# Patient Record
Sex: FEMALE | Race: WHITE | NOT HISPANIC OR LATINO | Employment: FULL TIME | URBAN - METROPOLITAN AREA
[De-identification: names, ages, dates, MRNs, and addresses within clinical notes are randomized per-mention and may not be internally consistent; named-entity substitution may affect disease eponyms.]

---

## 2024-12-03 ENCOUNTER — HOSPITAL ENCOUNTER (OUTPATIENT)
Dept: RADIOLOGY | Facility: EXTERNAL LOCATION | Age: 39
Discharge: HOME | End: 2024-12-03

## 2024-12-03 DIAGNOSIS — R92.8 ABNORMAL MAMMOGRAM: ICD-10-CM

## 2024-12-24 ENCOUNTER — HOSPITAL ENCOUNTER (OUTPATIENT)
Dept: RADIOLOGY | Facility: HOSPITAL | Age: 39
Discharge: HOME | End: 2024-12-24
Payer: COMMERCIAL

## 2024-12-24 VITALS — HEIGHT: 64 IN | BODY MASS INDEX: 29.88 KG/M2 | WEIGHT: 175 LBS

## 2024-12-24 DIAGNOSIS — R92.8 ABNORMAL MAMMOGRAM: ICD-10-CM

## 2024-12-24 PROCEDURE — 77066 DX MAMMO INCL CAD BI: CPT | Mod: LEFT SIDE | Performed by: RADIOLOGY

## 2024-12-24 PROCEDURE — 77062 BREAST TOMOSYNTHESIS BI: CPT

## 2024-12-24 PROCEDURE — 77062 BREAST TOMOSYNTHESIS BI: CPT | Mod: LEFT SIDE | Performed by: RADIOLOGY

## 2024-12-24 PROCEDURE — 76642 ULTRASOUND BREAST LIMITED: CPT | Mod: LEFT SIDE | Performed by: RADIOLOGY

## 2024-12-24 PROCEDURE — 76981 USE PARENCHYMA: CPT | Mod: LT

## 2024-12-24 PROCEDURE — 76642 ULTRASOUND BREAST LIMITED: CPT | Mod: LT

## 2024-12-27 ENCOUNTER — OFFICE VISIT (OUTPATIENT)
Dept: SURGICAL ONCOLOGY | Facility: HOSPITAL | Age: 39
End: 2024-12-27
Payer: COMMERCIAL

## 2024-12-27 VITALS
HEIGHT: 64 IN | WEIGHT: 175 LBS | RESPIRATION RATE: 16 BRPM | BODY MASS INDEX: 29.88 KG/M2 | DIASTOLIC BLOOD PRESSURE: 66 MMHG | HEART RATE: 81 BPM | SYSTOLIC BLOOD PRESSURE: 102 MMHG

## 2024-12-27 DIAGNOSIS — N61.22 GRANULOMATOUS MASTITIS OF LEFT BREAST: Primary | ICD-10-CM

## 2024-12-27 DIAGNOSIS — N64.4 BREAST PAIN: ICD-10-CM

## 2024-12-27 PROCEDURE — 99203 OFFICE O/P NEW LOW 30 MIN: CPT | Performed by: NURSE PRACTITIONER

## 2024-12-27 PROCEDURE — 99213 OFFICE O/P EST LOW 20 MIN: CPT | Performed by: NURSE PRACTITIONER

## 2024-12-27 PROCEDURE — 3008F BODY MASS INDEX DOCD: CPT | Performed by: NURSE PRACTITIONER

## 2024-12-27 RX ORDER — PREDNISONE 10 MG/1
TABLET ORAL
Qty: 138 TABLET | Refills: 0 | Status: SHIPPED | OUTPATIENT
Start: 2024-12-27

## 2024-12-27 RX ORDER — ACETAMINOPHEN 500 MG
TABLET ORAL EVERY 6 HOURS PRN
COMMUNITY

## 2024-12-27 RX ORDER — GUAIFENESIN 600 MG/1
1200 TABLET, EXTENDED RELEASE ORAL 2 TIMES DAILY
COMMUNITY

## 2024-12-27 RX ORDER — IBUPROFEN 200 MG
200 TABLET ORAL EVERY 6 HOURS
COMMUNITY

## 2024-12-27 NOTE — PROGRESS NOTES
[facility] [place]  [place]   1985 39 y.o.   [id number]      Chief Complaint  New patient, left breast granulomatous mastitis.    History Of Present Illness  Shira Negro is a very pleasant 39 y.o.  female presenting to the breast center with a left breast mass. Her breasts are normally tender around her cycle and goes away. The week of 10/15/24 the left breast pain did not go away and noticed that her nipple was retracted and felt a left breast mass. She had imaging at [facility] and a biopsy was recommended. 24 she had a left breast ultrasound guided core biopsy. The pain after was worse and more swollen and full. Pathology showed granulomatous changes with inflammation. She completed 10 days of Bactrim and Doxycycline and felt relief. She saw infection disease for further evaluation. She denies breast surgery. She has a family history of breast cancer in her maternal grandfather, age 40. Today she does have some tenderness but denies redness, warmth, fever, chills or drainage.    BREAST IMAGIN2024 Bilateral diagnostic mammogram with left ultrasound, indicates BI-RADS Category 2. No mammographic or targeted sonographic evidence of malignancy. Palpable area of concern corresponds to the areas previously biopsied. Clinical follow-up is recommended.    REPRODUCTIVE HISTORY: menarche age 14, , first birth age 32, did not breastfeed, OCP's x 6 years, premenopausal, LMP 24, heterogeneously dense                                  FAMILY CANCER HISTORY:   Maternal Grandfather: Breast cancer, age 40     Review of Systems  Constitutional:  Negative for appetite change, fatigue, fever and unexpected weight change.   HENT:  Negative for ear pain, hearing loss, nosebleeds, sore throat and trouble swallowing. Positive hoarseness.  Eyes:  Negative for discharge, itching and visual disturbance.   Breast: As stated in HPI.  Respiratory:  Negative for chest tightness and  shortness of breath.  Positive cough.  Cardiovascular:  Negative for chest pain, palpitations and leg swelling.   Gastrointestinal:  Negative for abdominal pain, constipation, diarrhea and nausea.   Endocrine: Negative for cold intolerance and heat intolerance.   Genitourinary:  Negative for dysuria, frequency, hematuria, pelvic pain and vaginal bleeding.   Musculoskeletal:  Negative for arthralgias, back pain, gait problem, joint swelling and myalgias.   Skin:  Negative for color change and rash.   Allergic/Immunologic: Negative for environmental allergies and food allergies.   Neurological:  Negative for dizziness, tremors, speech difficulty, weakness, numbness. Positive headache.   Hematological:  Does not bruise/bleed easily.   Psychiatric/Behavioral:  Negative for agitation, dysphoric mood and sleep disturbance. The patient is not nervous/anxious.       Past Medical History  She has no past medical history on file.    Surgical History  She has no past surgical history on file.    Family History  Cancer-related family history includes Breast cancer (age of onset: 40) in her maternal grandfather.     Social History  She has no history on file for tobacco use, alcohol use, and drug use.    Allergies  Patient has no known allergies.    Medications  Current Outpatient Medications   Medication Instructions    acetaminophen (Tylenol) 500 mg tablet oral, Every 6 hours PRN    guaiFENesin (MUCINEX) 1,200 mg, oral, 2 times daily, Do not crush, chew, or split.    ibuprofen 200 mg, oral, Every 6 hours    predniSONE (Deltasone) 10 mg tablet Weeks 1-2: 30 mg (3 tabs) PO BID x14 days. Week 3: 20mg (2 tabs) PO BID x7 days. Week 4: 10mg (1 tab) PO BID x7 days. Week 5: 5 mg (1/2 tab) PO BID x7 days. Week 6: 5 mg (1/2 tab) PO daily x7 days, D/C       Last Recorded Vitals  Vitals:    12/27/24 1253   BP: 102/66   Pulse: 81   Resp: 16        Physical Exam  Chest:         Patient is alert and oriented x3 and in a relaxed and  appropriate mood. Her gait is steady and hand grasps are equal. Sclera is clear. The breasts are nearly symmetrical. Left breast mass subareolar and the nipple is retracted. The skin is without erythema or warmth. The right breast tissue is soft without palpable abnormalities, discrete nodules or masses. The skin and nipple appear normal. There is no cervical, supraclavicular or axillary lymphadenopathy.     Relevant Results and Imaging  BI mammo bilateral diagnostic tomosynthesis 12/24/2024  BI US breast limited left 12/24/2024    Narrative  Interpreted By:  Lotus Early,  STUDY:  BI US BREAST LIMITED LEFT; BI MAMMO BILATERAL DIAGNOSTIC  TOMOSYNTHESIS;  12/24/2024 3:15 pm; 12/24/2024 3:35 pm    ACCESSION NUMBER(S):  [id number]; [id number]    ORDERING CLINICIAN:  MCKAY DENNISON    INDICATION:  History of 2 benign left breast biopsies. Patient had an outside  consultation and additional images were recommended for bilateral  asymmetries. Ultrasound was also recommended for a left breast  palpable area of concern.    ,R92.8 Other abnormal and inconclusive findings on diagnostic imaging  of breast    COMPARISON:  11/26/2024, 11/15/2024    FINDINGS:  MAMMOGRAPHY: 2D and tomosynthesis images were reviewed at 1 mm slice  thickness.    Density:  The breasts are heterogeneously dense, which may obscure  small masses.    The previously noted asymmetry centrally at far posterior depth on  the right MLO view resolves into fibroglandular and fatty breast  tissue with the additional images. The previously noted masslike  asymmetry in the lateral left breast resolves into fibroglandular and  fatty breast tissue. An ultrasound on the left will be performed for  further evaluation. No suspicious masses or calcifications are  identified.    ULTRASOUND: Targeted ultrasound was performed of the lateral left  breast. There are no suspicious masses or suspicious areas of  acoustic shadowing. The previous area of palpable concern  was  evaluated. The palpable area of corresponds to the biopsy sites with  2 biopsy marking clips.    Impression  No mammographic or targeted sonographic evidence of malignancy.  Palpable area of concern corresponds to the areas previously  biopsied. Clinical follow-up is recommended.    BI-RADS CATEGORY:  BI-RADS Category:  2 Benign.  Recommendation:  Clinical Follow-up and Continued Annual Screening.  Recommended Date:  Immediate.  Laterality:  Left.    For any future breast imaging appointments, please call [phone]).      MACRO:  None    Signed by: Lotus Early 12/24/2024 3:38 PM  Dictation workstation:   [id number]      I explained the results in depth, along with suggested explanation for follow up recommendations based on the testing results. BI-RADS Category 2    Orders  Orders Placed This Encounter      predniSONE (Deltasone) 10 mg tablet       Visit Diagnosis  1. Granulomatous mastitis of left breast  predniSONE (Deltasone) 10 mg tablet      2. Breast pain            Assessment/Plan  Left breast granulomatous mastitis on core biopsy, left breast pain, family history breast cancer, heterogeneously dense    Plan/Patient Discussion/Summary  Prednisone taper over 12 weeks as directed. Follow up in 3 weeks or sooner if symptoms worsen.    You can see your health information, review clinical summaries from office visits & test results online when you follow your health with MY  Chart, a personal health record. To sign up go to [email or web address]. If you need assistance with signing up or trouble getting into your account call [facility] Patient Line 24/7 at [phone].    My office phone number is [phone] if you need to get in touch with me or have additional questions or concerns. Thank you for choosing [facility] and trusting me as your healthcare provider. I look forward to seeing you again at your next office visit. I am honored to be a provider on your health care  team and I remain dedicated to helping you achieve your health goals.    Zoie Rizo, APRN-CNP

## 2025-01-14 NOTE — PROGRESS NOTES
[facility] - [place]  Shira Negro female   1985 39 y.o.   [id number]      Chief Complaint  Follow up left breast granulomatous mastitis.    History Of Present Illness  Shira Negro is a very pleasant 39 y.o.  female presenting to the breast center with a left breast mass. Her breasts are normally tender around her cycle and goes away. The week of 10/15/24 the left breast pain did not go away and noticed that her nipple was retracted and felt a left breast mass. She had imaging at [facility] and a biopsy was recommended. 24 she had a left breast ultrasound guided core biopsy. The pain after was worse and more swollen and full. Pathology showed granulomatous changes with inflammation. She completed 10 days of Bactrim and Doxycycline and felt relief. She saw infection disease for further evaluation. She denies breast surgery. She has a family history of breast cancer in her maternal grandfather, age 40. Today she does have some tenderness but denies redness, warmth, fever, chills or drainage. She feels feels her breast felt better and her nipple was more normal at the highest dose of steroids. As she has started to taper down she has noticed that her symptoms are coming back. With the prednisone she has noticed an increase in appetite, irritability, and bloating.    BREAST IMAGIN2024 Bilateral diagnostic mammogram with left ultrasound, indicates BI-RADS Category 2. No mammographic or targeted sonographic evidence of malignancy. Palpable area of concern corresponds to the areas previously biopsied. Clinical follow-up is recommended.    REPRODUCTIVE HISTORY: menarche age 14, , first birth age 32, did not breastfeed, OCP's x 6 years, premenopausal, LMP , heterogeneously dense                                  FAMILY CANCER HISTORY:   Maternal Grandfather: Breast cancer, age 40     Review of Systems  Constitutional:  Negative for appetite change, fatigue, fever. Positive weight  gain.  HENT:  Negative for ear pain, hearing loss, nosebleeds, sore throat and trouble swallowing.    Eyes:  Negative for discharge, itching and visual disturbance.   Breast: As stated in HPI.  Respiratory:  Negative for cough, chest tightness and shortness of breath.    Cardiovascular:  Negative for chest pain, palpitations and leg swelling.   Gastrointestinal:  Negative for abdominal pain, constipation, diarrhea and nausea.   Endocrine: Negative for cold intolerance and heat intolerance.   Genitourinary:  Negative for dysuria, frequency, hematuria, pelvic pain and vaginal bleeding.   Musculoskeletal:  Negative for arthralgias, back pain, gait problem, joint swelling and myalgias.   Skin:  Negative for color change and rash.   Allergic/Immunologic: Negative for environmental allergies and food allergies.   Neurological:  Negative for dizziness, tremors, speech difficulty, weakness, numbness and headaches.   Hematological:  Does not bruise/bleed easily.   Psychiatric/Behavioral:  Negative for agitation, dysphoric mood and sleep disturbance. The patient is not nervous/anxious.         Past Medical History  She has no past medical history on file.    Surgical History  She has no past surgical history on file.    Family History  Cancer-related family history includes Breast cancer (age of onset: 40) in her maternal grandfather.     Social History  She reports that she has never smoked. She has never used smokeless tobacco. No history on file for alcohol use and drug use.    Allergies  Patient has no known allergies.    Medications  Current Outpatient Medications   Medication Instructions    acetaminophen (Tylenol) 500 mg tablet Every 6 hours PRN    famotidine (Pepcid) 10 mg tablet oral    guaiFENesin (MUCINEX) 1,200 mg, 2 times daily    ibuprofen 200 mg, Every 6 hours    predniSONE (Deltasone) 10 mg tablet Weeks 1-2: 30 mg (3 tabs) PO BID x14 days. Week 3: 20mg (2 tabs) PO BID x7 days. Week 4: 10mg (1 tab) PO BID x7  days. Week 5: 5 mg (1/2 tab) PO BID x7 days. Week 6: 5 mg (1/2 tab) PO daily x7 days, D/C    predniSONE (Deltasone) 10 mg tablet Weeks 1-2: 30 mg (3 tabs) PO BID x14 days. Week 3: 20mg (2 tabs) PO BID x7 days. Week 4: 10mg (1 tab) PO BID x7 days. Week 5: 5 mg (1/2 tab) PO BID x7 days. Week 6: 5 mg (1/2 tab) PO daily x7 days, D/C       Last Recorded Vitals  Vitals:    01/16/25 1245   BP: 122/70   Pulse: 96   Resp: 16          Physical Exam  Chest:         Patient is alert and oriented x3 and in a relaxed and appropriate mood. Her gait is steady and hand grasps are equal. Sclera is clear. The breasts are nearly symmetrical. Left breast mass subareolar and the nipple is retracted and mass measures 1.5 x 1.5 cm. The skin is without erythema or warmth. The right breast tissue is soft without palpable abnormalities, discrete nodules or masses. The skin and nipple appear normal. There is no cervical, supraclavicular or axillary lymphadenopathy.     Relevant Results and Imaging  BI mammo bilateral diagnostic tomosynthesis 12/24/2024  BI US breast limited left 12/24/2024    Narrative  Interpreted By:  Lotus Early,  STUDY:  BI US BREAST LIMITED LEFT; BI MAMMO BILATERAL DIAGNOSTIC  TOMOSYNTHESIS;  12/24/2024 3:15 pm; 12/24/2024 3:35 pm    ACCESSION NUMBER(S):  [id number]; [id number]    ORDERING CLINICIAN:  MCKAY DENNISON    INDICATION:  History of 2 benign left breast biopsies. Patient had an outside  consultation and additional images were recommended for bilateral  asymmetries. Ultrasound was also recommended for a left breast  palpable area of concern.    ,R92.8 Other abnormal and inconclusive findings on diagnostic imaging  of breast    COMPARISON:  11/26/2024, 11/15/2024    FINDINGS:  MAMMOGRAPHY: 2D and tomosynthesis images were reviewed at 1 mm slice  thickness.    Density:  The breasts are heterogeneously dense, which may obscure  small masses.    The previously noted asymmetry centrally at far posterior depth  on  the right MLO view resolves into fibroglandular and fatty breast  tissue with the additional images. The previously noted masslike  asymmetry in the lateral left breast resolves into fibroglandular and  fatty breast tissue. An ultrasound on the left will be performed for  further evaluation. No suspicious masses or calcifications are  identified.    ULTRASOUND: Targeted ultrasound was performed of the lateral left  breast. There are no suspicious masses or suspicious areas of  acoustic shadowing. The previous area of palpable concern was  evaluated. The palpable area of corresponds to the biopsy sites with  2 biopsy marking clips.    Impression  No mammographic or targeted sonographic evidence of malignancy.  Palpable area of concern corresponds to the areas previously  biopsied. Clinical follow-up is recommended.    BI-RADS CATEGORY:  BI-RADS Category:  2 Benign.  Recommendation:  Clinical Follow-up and Continued Annual Screening.  Recommended Date:  Immediate.  Laterality:  Left.    For any future breast imaging appointments, please call [phone]).      MACRO:  None    Signed by: Lotus Early 12/24/2024 3:38 PM  Dictation workstation:   [id number]      Visit Diagnosis  1. Granulomatous mastitis of left breast  predniSONE (Deltasone) 10 mg tablet      2. Breast pain            Assessment/Plan  Left breast granulomatous mastitis on core biopsy, left breast pain, family history breast cancer, heterogeneously dense    Plan/Patient Discussion/Summary  She is agreeable to increase the prednisone and taper as directed. Follow up in 3 weeks or sooner if symptoms worsen.    You can see your health information, review clinical summaries from office visits & test results online when you follow your health with MY  Chart, a personal health record. To sign up go to [email or web address]. If you need assistance with signing up or trouble getting into your account call [facility] Patient Line 24/7 at  [phone].    My office phone number is [phone] if you need to get in touch with me or have additional questions or concerns. Thank you for choosing [facility] and trusting me as your healthcare provider. I look forward to seeing you again at your next office visit. I am honored to be a provider on your health care team and I remain dedicated to helping you achieve your health goals.    Zoie Rizo, OBED-CNP

## 2025-01-16 ENCOUNTER — OFFICE VISIT (OUTPATIENT)
Dept: SURGICAL ONCOLOGY | Facility: HOSPITAL | Age: 40
End: 2025-01-16
Payer: COMMERCIAL

## 2025-01-16 VITALS
DIASTOLIC BLOOD PRESSURE: 70 MMHG | HEIGHT: 65 IN | RESPIRATION RATE: 16 BRPM | HEART RATE: 96 BPM | SYSTOLIC BLOOD PRESSURE: 122 MMHG | BODY MASS INDEX: 29.16 KG/M2 | WEIGHT: 175 LBS

## 2025-01-16 DIAGNOSIS — N64.4 BREAST PAIN: ICD-10-CM

## 2025-01-16 DIAGNOSIS — N61.22 GRANULOMATOUS MASTITIS OF LEFT BREAST: Primary | ICD-10-CM

## 2025-01-16 PROCEDURE — 3008F BODY MASS INDEX DOCD: CPT | Performed by: NURSE PRACTITIONER

## 2025-01-16 PROCEDURE — 99213 OFFICE O/P EST LOW 20 MIN: CPT | Performed by: NURSE PRACTITIONER

## 2025-01-16 RX ORDER — FAMOTIDINE 10 MG/1
TABLET ORAL
COMMUNITY

## 2025-01-17 RX ORDER — PREDNISONE 10 MG/1
TABLET ORAL
Qty: 138 TABLET | Refills: 0 | Status: SHIPPED | OUTPATIENT
Start: 2025-01-17

## 2025-02-05 RX ORDER — DOXYCYCLINE HYCLATE 100 MG
100 TABLET ORAL 2 TIMES DAILY
Qty: 20 TABLET | Refills: 0 | Status: SHIPPED | OUTPATIENT
Start: 2025-02-05 | End: 2025-02-15

## 2025-02-05 RX ORDER — SULFAMETHOXAZOLE AND TRIMETHOPRIM 800; 160 MG/1; MG/1
1 TABLET ORAL 2 TIMES DAILY
Qty: 20 TABLET | Refills: 0 | Status: SHIPPED | OUTPATIENT
Start: 2025-02-05 | End: 2025-02-15

## 2025-02-06 ENCOUNTER — OFFICE VISIT (OUTPATIENT)
Dept: SURGICAL ONCOLOGY | Facility: HOSPITAL | Age: 40
End: 2025-02-06
Payer: COMMERCIAL

## 2025-02-06 VITALS
HEIGHT: 65 IN | RESPIRATION RATE: 16 BRPM | SYSTOLIC BLOOD PRESSURE: 143 MMHG | DIASTOLIC BLOOD PRESSURE: 80 MMHG | BODY MASS INDEX: 29.99 KG/M2 | WEIGHT: 180 LBS | HEART RATE: 105 BPM

## 2025-02-06 DIAGNOSIS — N61.22 GRANULOMATOUS MASTITIS OF LEFT BREAST: Primary | ICD-10-CM

## 2025-02-06 PROCEDURE — 3008F BODY MASS INDEX DOCD: CPT | Performed by: NURSE PRACTITIONER

## 2025-02-06 PROCEDURE — 99213 OFFICE O/P EST LOW 20 MIN: CPT | Performed by: NURSE PRACTITIONER

## 2025-02-06 PROCEDURE — 1036F TOBACCO NON-USER: CPT | Performed by: NURSE PRACTITIONER

## 2025-02-06 NOTE — PROGRESS NOTES
[facility]  Shira Negro female   1985 39 y.o.   [id number]      Chief Complaint  Follow up left breast granulomatous mastitis.    History Of Present Illness  Shira Negro is a very pleasant 39 y.o.  female presenting to the breast center with a left breast mass. Her breasts are normally tender around her cycle and goes away. The week of 10/15/24 the left breast pain did not go away and noticed that her nipple was retracted and felt a left breast mass. She had imaging at [facility] and a biopsy was recommended. 24 she had a left breast ultrasound guided core biopsy. The pain after was worse and more swollen and full. Pathology showed granulomatous changes with inflammation. She completed 10 days of Bactrim and Doxycycline and felt relief. She saw infection disease for further evaluation. She denies breast surgery. She has a family history of breast cancer in her maternal grandfather, age 40. She feels feels her breast felt better and her nipple was more normal at the highest dose of steroids. As she has started to taper down she has noticed that her symptoms are coming back. With the prednisone she has noticed an increase in appetite, irritability, and bloating. On Monday she noticed a red spot on her breast and by Tuesday she was having pain, redness, and warmth. Wednesday she was started back on 10 days of Doxycycline and Bactrim.    BREAST IMAGIN2024 Bilateral diagnostic mammogram with left ultrasound, indicates BI-RADS Category 2. No mammographic or targeted sonographic evidence of malignancy. Palpable area of concern corresponds to the areas previously biopsied. Clinical follow-up is recommended.    REPRODUCTIVE HISTORY: menarche age 14, , first birth age 32, did not breastfeed, OCP's x 6 years, premenopausal, LMP , heterogeneously dense                                  FAMILY CANCER HISTORY:   Maternal Grandfather: Breast cancer, age 40     Review of  Systems  Constitutional:  Negative for appetite change, fatigue, fever and unexpected weight change.   HENT:  Negative for ear pain, hearing loss, nosebleeds, sore throat and trouble swallowing.    Eyes:  Negative for discharge, itching and visual disturbance.   Breast: As stated in HPI.  Respiratory:  Negative for cough, chest tightness and shortness of breath.    Cardiovascular:  Negative for chest pain, palpitations and leg swelling.   Gastrointestinal:  Negative for abdominal pain, constipation, diarrhea and nausea.   Endocrine: Negative for cold intolerance and heat intolerance.   Genitourinary:  Negative for dysuria, frequency, hematuria, pelvic pain and vaginal bleeding.   Musculoskeletal:  Negative for arthralgias, back pain, gait problem, joint swelling and myalgias.   Skin:  Negative for color change and rash.   Allergic/Immunologic: Negative for environmental allergies and food allergies.   Neurological:  Negative for dizziness, tremors, speech difficulty, weakness, numbness and headaches.   Hematological:  Does not bruise/bleed easily.   Psychiatric/Behavioral:  Negative for agitation, dysphoric mood and sleep disturbance. The patient is not nervous/anxious.           Past Medical History  She has no past medical history on file.    Surgical History  She has no past surgical history on file.    Family History  Cancer-related family history includes Breast cancer (age of onset: 40) in her maternal grandfather.     Social History  She reports that she has never smoked. She has never used smokeless tobacco. No history on file for alcohol use and drug use.    Allergies  Patient has no known allergies.    Medications  Current Outpatient Medications   Medication Instructions    acetaminophen (Tylenol) 500 mg tablet Every 6 hours PRN    doxycycline (VIBRA-TABS) 100 mg, oral, 2 times daily, Take with a full glass of water and do not lie down for at least 30 minutes after.    famotidine (Pepcid) 10 mg tablet Take  by mouth.    ibuprofen 200 mg, Every 6 hours    predniSONE (Deltasone) 10 mg tablet Weeks 1-2: 30 mg (3 tabs) PO BID x14 days. Week 3: 20mg (2 tabs) PO BID x7 days. Week 4: 10mg (1 tab) PO BID x7 days. Week 5: 5 mg (1/2 tab) PO BID x7 days. Week 6: 5 mg (1/2 tab) PO daily x7 days, D/C    predniSONE (Deltasone) 10 mg tablet Weeks 1-2: 30 mg (3 tabs) PO BID x14 days. Week 3: 20mg (2 tabs) PO BID x7 days. Week 4: 10mg (1 tab) PO BID x7 days. Week 5: 5 mg (1/2 tab) PO BID x7 days. Week 6: 5 mg (1/2 tab) PO daily x7 days, D/C    sulfamethoxazole-trimethoprim (Bactrim DS) 800-160 mg tablet 1 tablet, oral, 2 times daily       Last Recorded Vitals  Vitals:    02/06/25 1047   BP: 143/80   Pulse: 105   Resp: 16          Physical Exam  Chest:       Patient is alert and oriented x3 and in a relaxed and appropriate mood. Her gait is steady and hand grasps are equal. Sclera is clear. The breasts are nearly symmetrical. Left breast 9:00 is a 4 cm x 2 cm firm area of erythema, warmth, and tenderness. The nipples appear normal. There is no cervical, supraclavicular or axillary lymphadenopathy.     Relevant Results and Imaging  BI mammo bilateral diagnostic tomosynthesis 12/24/2024  BI US breast limited left 12/24/2024    Narrative  Interpreted By:  Lotus Early,  STUDY:  BI US BREAST LIMITED LEFT; BI MAMMO BILATERAL DIAGNOSTIC  TOMOSYNTHESIS;  12/24/2024 3:15 pm; 12/24/2024 3:35 pm    ACCESSION NUMBER(S):  [id number]; [id number]    ORDERING CLINICIAN:  MCKAY DENNISON    INDICATION:  History of 2 benign left breast biopsies. Patient had an outside  consultation and additional images were recommended for bilateral  asymmetries. Ultrasound was also recommended for a left breast  palpable area of concern.    ,R92.8 Other abnormal and inconclusive findings on diagnostic imaging  of breast    COMPARISON:  11/26/2024, 11/15/2024    FINDINGS:  MAMMOGRAPHY: 2D and tomosynthesis images were reviewed at 1 mm slice  thickness.    Density:   The breasts are heterogeneously dense, which may obscure  small masses.    The previously noted asymmetry centrally at far posterior depth on  the right MLO view resolves into fibroglandular and fatty breast  tissue with the additional images. The previously noted masslike  asymmetry in the lateral left breast resolves into fibroglandular and  fatty breast tissue. An ultrasound on the left will be performed for  further evaluation. No suspicious masses or calcifications are  identified.    ULTRASOUND: Targeted ultrasound was performed of the lateral left  breast. There are no suspicious masses or suspicious areas of  acoustic shadowing. The previous area of palpable concern was  evaluated. The palpable area of corresponds to the biopsy sites with  2 biopsy marking clips.    Impression  No mammographic or targeted sonographic evidence of malignancy.  Palpable area of concern corresponds to the areas previously  biopsied. Clinical follow-up is recommended.    BI-RADS CATEGORY:  BI-RADS Category:  2 Benign.  Recommendation:  Clinical Follow-up and Continued Annual Screening.  Recommended Date:  Immediate.  Laterality:  Left.    For any future breast imaging appointments, please call [phone]).      MACRO:  None    Signed by: Lotus Early 12/24/2024 3:38 PM  Dictation workstation:   [id number]      Visit Diagnosis  1. Granulomatous mastitis of left breast  sulfamethoxazole-trimethoprim (Bactrim DS) 800-160 mg tablet    doxycycline (Vibra-Tabs) 100 mg tablet          Assessment/Plan  Left breast granulomatous mastitis on core biopsy, left breast pain and infection, family history breast cancer, heterogeneously dense    Plan/Patient Discussion/Summary  Bactrim 1 tab twice a day for 10 days, doxycycline 1 tab twice a day for 10 days and continue prednisone taper. Return after antibiotics completed or sooner if symptoms worsen.    You can see your health information, review clinical summaries from office  visits & test results online when you follow your health with MY  Chart, a personal health record. To sign up go to [email or web address]. If you need assistance with signing up or trouble getting into your account call [employer] Patient Line 24/7 at [phone].    My office phone number is [phone] if you need to get in touch with me or have additional questions or concerns. Thank you for choosing [facility] and trusting me as your healthcare provider. I look forward to seeing you again at your next office visit. I am honored to be a provider on your health care team and I remain dedicated to helping you achieve your health goals.    OBED Obrien-CNP

## 2025-02-14 NOTE — PROGRESS NOTES
[facility]  Shira Negro female   1985 39 y.o.   [id number]      Chief Complaint  Follow up left breast granulomatous mastitis.    History Of Present Illness  Shira Negro is a very pleasant 39 y.o.  female who originally presented to the breast center with a left breast mass. Her breasts are normally tender around her cycle and goes away. The week of 10/15/24 the left breast pain did not go away and noticed that her nipple was retracted and felt a left breast mass. She had imaging at [facility] and a biopsy was recommended. 24 she had a left breast ultrasound guided core biopsy. The pain after was worse and more swollen and full. Pathology showed granulomatous changes with inflammation. She completed 10 days of Bactrim and Doxycycline and felt relief. She saw infection disease for further evaluation. She denies breast surgery. She has a family history of breast cancer in her maternal grandfather, age 40. She feels feels her breast felt better and her nipple was more normal at the highest dose of steroids. As she has started to taper down she has noticed that her symptoms were coming back and was placed on 10 more days of Doxycycline and Bactrim. She finished these on  and recently had the area open up and start draining serous fluid. It is tender at times.     BREAST IMAGIN2024 Bilateral diagnostic mammogram with left ultrasound, indicates BI-RADS Category 2. No mammographic or targeted sonographic evidence of malignancy. Palpable area of concern corresponds to the areas previously biopsied. Clinical follow-up is recommended.    REPRODUCTIVE HISTORY: menarche age 14, , first birth age 32, did not breastfeed, OCP's x 6 years, premenopausal, LMP , heterogeneously dense                                  FAMILY CANCER HISTORY:   Maternal Grandfather: Breast cancer, age 40     Review of Systems  Constitutional:  Negative for appetite change, fatigue, fever and  unexpected weight change.   HENT:  Negative for ear pain, hearing loss, nosebleeds, and trouble swallowing. Positive sore throat and hoarseness.  Eyes:  Negative for discharge, itching and visual disturbance.   Breast: As stated in HPI.  Respiratory:  Negative for chest tightness and shortness of breath.  Positive cough and wheezing.  Cardiovascular:  Negative for chest pain, palpitations and leg swelling.   Gastrointestinal:  Negative for abdominal pain, constipation, diarrhea and nausea.   Endocrine: Negative for cold intolerance and heat intolerance.   Genitourinary:  Negative for dysuria, frequency, hematuria, pelvic pain and vaginal bleeding.   Musculoskeletal:  Negative for arthralgias, back pain, gait problem, joint swelling and myalgias.   Skin:  Negative for color change and rash.   Allergic/Immunologic: Negative for environmental allergies and food allergies.   Neurological:  Negative for dizziness, tremors, speech difficulty, weakness, numbness and headaches.   Hematological:  Does not bruise/bleed easily.   Psychiatric/Behavioral:  Negative for agitation, dysphoric mood and sleep disturbance. The patient is not nervous/anxious.           Past Medical History  She has no past medical history on file.    Surgical History  She has no past surgical history on file.    Family History  Cancer-related family history includes Breast cancer (age of onset: 40) in her maternal grandfather.     Social History  She reports that she has never smoked. She has never used smokeless tobacco. No history on file for alcohol use and drug use.    Allergies  Patient has no known allergies.    Medications  Current Outpatient Medications   Medication Instructions    acetaminophen (Tylenol) 500 mg tablet Every 6 hours PRN    doxycycline (VIBRA-TABS) 100 mg, oral, 2 times daily, Take with a full glass of water and do not lie down for at least 30 minutes after.    famotidine (Pepcid) 10 mg tablet Take by mouth.    ibuprofen 200 mg,  Every 6 hours    predniSONE (Deltasone) 10 mg tablet Weeks 1-2: 30 mg (3 tabs) PO BID x14 days. Week 3: 20mg (2 tabs) PO BID x7 days. Week 4: 10mg (1 tab) PO BID x7 days. Week 5: 5 mg (1/2 tab) PO BID x7 days. Week 6: 5 mg (1/2 tab) PO daily x7 days, D/C    predniSONE (Deltasone) 10 mg tablet Weeks 1-2: 30 mg (3 tabs) PO BID x14 days. Week 3: 20mg (2 tabs) PO BID x7 days. Week 4: 10mg (1 tab) PO BID x7 days. Week 5: 5 mg (1/2 tab) PO BID x7 days. Week 6: 5 mg (1/2 tab) PO daily x7 days, D/C    sulfamethoxazole-trimethoprim (Bactrim DS) 800-160 mg tablet 1 tablet, oral, 2 times daily       Last Recorded Vitals  Vitals:    02/17/25 0827   BP: 121/84   Pulse: 80   Resp: 16            Physical Exam  Chest:         Patient is alert and oriented x3 and in a relaxed and appropriate mood. Her gait is steady and hand grasps are equal. Sclera is clear. The breasts are nearly symmetrical. Left breast 9:00 is a 2 cm x 1 cm flat area of erythema, warmth, and tenderness and pin point opening. The nipple appears normal. There is no cervical, supraclavicular or axillary lymphadenopathy.     Relevant Results and Imaging  BI mammo bilateral diagnostic tomosynthesis 12/24/2024  BI US breast limited left 12/24/2024    Narrative  Interpreted By:  Lotus Early,  STUDY:  BI US BREAST LIMITED LEFT; BI MAMMO BILATERAL DIAGNOSTIC  TOMOSYNTHESIS;  12/24/2024 3:15 pm; 12/24/2024 3:35 pm    ACCESSION NUMBER(S):  [id number]; [id number]    ORDERING CLINICIAN:  MCKAY DENNISON    INDICATION:  History of 2 benign left breast biopsies. Patient had an outside  consultation and additional images were recommended for bilateral  asymmetries. Ultrasound was also recommended for a left breast  palpable area of concern.    ,R92.8 Other abnormal and inconclusive findings on diagnostic imaging  of breast    COMPARISON:  11/26/2024, 11/15/2024    FINDINGS:  MAMMOGRAPHY: 2D and tomosynthesis images were reviewed at 1 mm slice  thickness.    Density:  The  breasts are heterogeneously dense, which may obscure  small masses.    The previously noted asymmetry centrally at far posterior depth on  the right MLO view resolves into fibroglandular and fatty breast  tissue with the additional images. The previously noted masslike  asymmetry in the lateral left breast resolves into fibroglandular and  fatty breast tissue. An ultrasound on the left will be performed for  further evaluation. No suspicious masses or calcifications are  identified.    ULTRASOUND: Targeted ultrasound was performed of the lateral left  breast. There are no suspicious masses or suspicious areas of  acoustic shadowing. The previous area of palpable concern was  evaluated. The palpable area of corresponds to the biopsy sites with  2 biopsy marking clips.    Impression  No mammographic or targeted sonographic evidence of malignancy.  Palpable area of concern corresponds to the areas previously  biopsied. Clinical follow-up is recommended.    BI-RADS CATEGORY:  BI-RADS Category:  2 Benign.  Recommendation:  Clinical Follow-up and Continued Annual Screening.  Recommended Date:  Immediate.  Laterality:  Left.    For any future breast imaging appointments, please call [phone]).      MACRO:  None    Signed by: Lotus Early 12/24/2024 3:38 PM  Dictation workstation:   [id number]      Visit Diagnosis  1. Granulomatous mastitis of left breast  doxycycline (Vibra-Tabs) 100 mg tablet    sulfamethoxazole-trimethoprim (Bactrim DS) 800-160 mg tablet      2. Breast pain              Assessment/Plan  Left breast granulomatous mastitis on core biopsy, left breast pain and infection, family history breast cancer, heterogeneously dense    Plan/Patient Discussion/Summary  Bactrim 1 tab twice a day for 5 days, doxycycline 1 tab twice a day for 5 days and continue prednisone taper. Return in 2 weeks or sooner if symptoms worsen.    You can see your health information, review clinical summaries from office  visits & test results online when you follow your health with MY  Chart, a personal health record. To sign up go to [email or web address]. If you need assistance with signing up or trouble getting into your account call [employer] Patient Line 24/7 at [phone].    My office phone number is [phone] if you need to get in touch with me or have additional questions or concerns. Thank you for choosing [facility] and trusting me as your healthcare provider. I look forward to seeing you again at your next office visit. I am honored to be a provider on your health care team and I remain dedicated to helping you achieve your health goals.    OBED Obrien-CNP

## 2025-02-17 ENCOUNTER — OFFICE VISIT (OUTPATIENT)
Dept: SURGICAL ONCOLOGY | Facility: HOSPITAL | Age: 40
End: 2025-02-17
Payer: COMMERCIAL

## 2025-02-17 VITALS
SYSTOLIC BLOOD PRESSURE: 121 MMHG | DIASTOLIC BLOOD PRESSURE: 84 MMHG | WEIGHT: 180 LBS | HEART RATE: 80 BPM | RESPIRATION RATE: 16 BRPM | HEIGHT: 65 IN | BODY MASS INDEX: 29.99 KG/M2

## 2025-02-17 DIAGNOSIS — N64.4 BREAST PAIN: ICD-10-CM

## 2025-02-17 DIAGNOSIS — N61.22 GRANULOMATOUS MASTITIS OF LEFT BREAST: Primary | ICD-10-CM

## 2025-02-17 PROCEDURE — 3008F BODY MASS INDEX DOCD: CPT | Performed by: NURSE PRACTITIONER

## 2025-02-17 PROCEDURE — 99213 OFFICE O/P EST LOW 20 MIN: CPT | Performed by: NURSE PRACTITIONER

## 2025-02-17 PROCEDURE — 1036F TOBACCO NON-USER: CPT | Performed by: NURSE PRACTITIONER

## 2025-02-17 RX ORDER — DOXYCYCLINE HYCLATE 100 MG
100 TABLET ORAL 2 TIMES DAILY
Qty: 10 TABLET | Refills: 0 | Status: SHIPPED | OUTPATIENT
Start: 2025-02-17 | End: 2025-02-22

## 2025-02-17 RX ORDER — SULFAMETHOXAZOLE AND TRIMETHOPRIM 800; 160 MG/1; MG/1
1 TABLET ORAL 2 TIMES DAILY
Qty: 10 TABLET | Refills: 0 | Status: SHIPPED | OUTPATIENT
Start: 2025-02-17 | End: 2025-02-22

## 2025-02-27 NOTE — PROGRESS NOTES
[facility]  Shira Negro female   1985 40 y.o.   [id number]      Chief Complaint  Follow up left breast granulomatous mastitis.    History Of Present Illness  Shira Negro is a very pleasant 40 y.o.  female who originally presented to the breast center with a left breast mass. Her breasts are normally tender around her cycle and goes away. The week of 10/15/24 the left breast pain did not go away and noticed that her nipple was retracted and felt a left breast mass. She had imaging at [facility] and a biopsy was recommended. 24 she had a left breast ultrasound guided core biopsy. The pain after was worse and more swollen and full. Pathology showed granulomatous changes with inflammation. She completed 10 days of Bactrim and Doxycycline and felt relief. She saw infection disease for further evaluation. She denies breast surgery. She has a family history of breast cancer in her maternal grandfather, age 40. She feels feels her breast felt better and her nipple was more normal at the highest dose of steroids. As she has started to taper down she has noticed that her symptoms were coming back and was placed on 10 more days of Doxycycline and Bactrim. She finished these on  and recently had the area open up and start draining serous fluid. It is tender at times.     BREAST IMAGIN2024 Bilateral diagnostic mammogram with left ultrasound, indicates BI-RADS Category 2. No mammographic or targeted sonographic evidence of malignancy. Palpable area of concern corresponds to the areas previously biopsied. Clinical follow-up is recommended.    REPRODUCTIVE HISTORY: menarche age 14, , first birth age 32, did not breastfeed, OCP's x 6 years, premenopausal, LMP , heterogeneously dense                                  FAMILY CANCER HISTORY:   Maternal Grandfather: Breast cancer, age 40     Review of Systems  Constitutional:  Negative for appetite change, fatigue, fever and  unexpected weight change.   HENT:  Negative for ear pain, hearing loss, nosebleeds, and trouble swallowing. Positive sore throat and hoarseness.  Eyes:  Negative for discharge, itching and visual disturbance.   Breast: As stated in HPI.  Respiratory:  Negative for chest tightness and shortness of breath.  Positive cough and wheezing.  Cardiovascular:  Negative for chest pain, palpitations and leg swelling.   Gastrointestinal:  Negative for abdominal pain, constipation, diarrhea and nausea.   Endocrine: Negative for cold intolerance and heat intolerance.   Genitourinary:  Negative for dysuria, frequency, hematuria, pelvic pain and vaginal bleeding.   Musculoskeletal:  Negative for arthralgias, back pain, gait problem, joint swelling and myalgias.   Skin:  Negative for color change and rash.   Allergic/Immunologic: Negative for environmental allergies and food allergies.   Neurological:  Negative for dizziness, tremors, speech difficulty, weakness, numbness and headaches.   Hematological:  Does not bruise/bleed easily.   Psychiatric/Behavioral:  Negative for agitation, dysphoric mood and sleep disturbance. The patient is not nervous/anxious.           Past Medical History  She has no past medical history on file.    Surgical History  She has no past surgical history on file.    Family History  Cancer-related family history includes Breast cancer (age of onset: 40) in her maternal grandfather.     Social History  She reports that she has never smoked. She has never used smokeless tobacco. No history on file for alcohol use and drug use.    Allergies  Patient has no known allergies.    Medications  Current Outpatient Medications   Medication Instructions    acetaminophen (Tylenol) 500 mg tablet Every 6 hours PRN    famotidine (Pepcid) 10 mg tablet Take by mouth.    ibuprofen 200 mg, Every 6 hours    predniSONE (Deltasone) 10 mg tablet Weeks 1-2: 30 mg (3 tabs) PO BID x14 days. Week 3: 20mg (2 tabs) PO BID x7 days. Week  4: 10mg (1 tab) PO BID x7 days. Week 5: 5 mg (1/2 tab) PO BID x7 days. Week 6: 5 mg (1/2 tab) PO daily x7 days, D/C    predniSONE (Deltasone) 10 mg tablet Weeks 1-2: 30 mg (3 tabs) PO BID x14 days. Week 3: 20mg (2 tabs) PO BID x7 days. Week 4: 10mg (1 tab) PO BID x7 days. Week 5: 5 mg (1/2 tab) PO BID x7 days. Week 6: 5 mg (1/2 tab) PO daily x7 days, D/C       Last Recorded Vitals  There were no vitals filed for this visit.           Physical Exam  Chest:         Patient is alert and oriented x3 and in a relaxed and appropriate mood. Her gait is steady and hand grasps are equal. Sclera is clear. The breasts are nearly symmetrical. Left breast 9:00 is a 2 cm x 1 cm flat area of erythema, warmth, and tenderness and pin point opening. The nipple appears normal. There is no cervical, supraclavicular or axillary lymphadenopathy.     Relevant Results and Imaging  BI mammo bilateral diagnostic tomosynthesis 12/24/2024  BI US breast limited left 12/24/2024    Narrative  Interpreted By:  Lotus Early,  STUDY:  BI US BREAST LIMITED LEFT; BI MAMMO BILATERAL DIAGNOSTIC  TOMOSYNTHESIS;  12/24/2024 3:15 pm; 12/24/2024 3:35 pm    ACCESSION NUMBER(S):  [id number]; [id number]    ORDERING CLINICIAN:  MCKAY DENNISON    INDICATION:  History of 2 benign left breast biopsies. Patient had an outside  consultation and additional images were recommended for bilateral  asymmetries. Ultrasound was also recommended for a left breast  palpable area of concern.    ,R92.8 Other abnormal and inconclusive findings on diagnostic imaging  of breast    COMPARISON:  11/26/2024, 11/15/2024    FINDINGS:  MAMMOGRAPHY: 2D and tomosynthesis images were reviewed at 1 mm slice  thickness.    Density:  The breasts are heterogeneously dense, which may obscure  small masses.    The previously noted asymmetry centrally at far posterior depth on  the right MLO view resolves into fibroglandular and fatty breast  tissue with the additional images. The  previously noted masslike  asymmetry in the lateral left breast resolves into fibroglandular and  fatty breast tissue. An ultrasound on the left will be performed for  further evaluation. No suspicious masses or calcifications are  identified.    ULTRASOUND: Targeted ultrasound was performed of the lateral left  breast. There are no suspicious masses or suspicious areas of  acoustic shadowing. The previous area of palpable concern was  evaluated. The palpable area of corresponds to the biopsy sites with  2 biopsy marking clips.    Impression  No mammographic or targeted sonographic evidence of malignancy.  Palpable area of concern corresponds to the areas previously  biopsied. Clinical follow-up is recommended.    BI-RADS CATEGORY:  BI-RADS Category:  2 Benign.  Recommendation:  Clinical Follow-up and Continued Annual Screening.  Recommended Date:  Immediate.  Laterality:  Left.    For any future breast imaging appointments, please call [phone]).      MACRO:  None    Signed by: Lotus Early 12/24/2024 3:38 PM  Dictation workstation:   [id number]      Visit Diagnosis  No diagnosis found.        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 Return in 2 weeks or sooner if symptoms worsen.    You can see your health information, review clinical summaries from office visits & test results online when you follow your health with MY  Chart, a personal health record. To sign up go to [email or web address]. If you need assistance with signing up or trouble getting into your account call [email or web address] Patient Line 24/7 at [phone].    My office phone number is [phone] if you need to get in touch with me or have additional questions or concerns. Thank  you for choosing [facility] and trusting me as your healthcare provider. I look forward to seeing you again at your next office visit. I am honored to be a provider on your health care team and I remain dedicated to helping you achieve your health goals.    Zoie Rizo, APRN-CNP

## 2025-03-03 ENCOUNTER — APPOINTMENT (OUTPATIENT)
Dept: SURGICAL ONCOLOGY | Facility: HOSPITAL | Age: 40
End: 2025-03-03
Payer: COMMERCIAL

## 2025-03-17 ENCOUNTER — OFFICE VISIT (OUTPATIENT)
Dept: SURGICAL ONCOLOGY | Facility: HOSPITAL | Age: 40
End: 2025-03-17
Payer: COMMERCIAL

## 2025-03-17 VITALS
RESPIRATION RATE: 16 BRPM | HEIGHT: 65 IN | BODY MASS INDEX: 29.99 KG/M2 | DIASTOLIC BLOOD PRESSURE: 83 MMHG | WEIGHT: 180 LBS | SYSTOLIC BLOOD PRESSURE: 123 MMHG | HEART RATE: 74 BPM

## 2025-03-17 DIAGNOSIS — N61.22 GRANULOMATOUS MASTITIS OF LEFT BREAST: Primary | ICD-10-CM

## 2025-03-17 PROCEDURE — 1036F TOBACCO NON-USER: CPT | Performed by: NURSE PRACTITIONER

## 2025-03-17 PROCEDURE — 99213 OFFICE O/P EST LOW 20 MIN: CPT | Performed by: NURSE PRACTITIONER

## 2025-03-17 PROCEDURE — 3008F BODY MASS INDEX DOCD: CPT | Performed by: NURSE PRACTITIONER

## 2025-03-17 RX ORDER — DOXYCYCLINE HYCLATE 100 MG
100 TABLET ORAL 2 TIMES DAILY
Qty: 28 TABLET | Refills: 0 | Status: SHIPPED | OUTPATIENT
Start: 2025-03-17 | End: 2025-03-31

## 2025-03-17 NOTE — PROGRESS NOTES
[facility]  Shira Negro female   1985 40 y.o.   [id number]      Chief Complaint  Follow up left breast granulomatous mastitis.    History Of Present Illness  Shira Negro is a very pleasant 40 y.o.  female who originally presented to the breast center with a left breast mass. Her breasts are normally tender around her cycle and goes away. The week of 10/15/24 the left breast pain did not go away and noticed that her nipple was retracted and felt a left breast mass. She had imaging at [facility] and a biopsy was recommended. 24 she had a left breast ultrasound guided core biopsy. The pain after was worse and more swollen and full. Pathology showed granulomatous changes with inflammation. She completed 10 days of Bactrim and Doxycycline and felt relief. She saw infection disease for further evaluation. She denies breast surgery. She has a family history of breast cancer in her maternal grandfather, age 40. She has been on antibiotics and a steroid taper. She finished these in the beginning of March and has been doing well until yesterday. The left breast has become slightly red and tender. No fever or chills.    BREAST IMAGIN2024 Bilateral diagnostic mammogram with left ultrasound, indicates BI-RADS Category 2. No mammographic or targeted sonographic evidence of malignancy. Palpable area of concern corresponds to the areas previously biopsied. Clinical follow-up is recommended.    REPRODUCTIVE HISTORY: menarche age 14, , first birth age 32, did not breastfeed, OCP's x 6 years, premenopausal, LMP , heterogeneously dense                                  FAMILY CANCER HISTORY:   Maternal Grandfather: Breast cancer, age 40     Review of Systems  Constitutional:  Negative for appetite change, fatigue, fever and unexpected weight change.   HENT:  Negative for ear pain, hearing loss, nosebleeds, sore throat and trouble swallowing.    Eyes:  Negative for discharge,  itching and visual disturbance.   Breast: As stated in HPI.  Respiratory:  Negative for cough, chest tightness and shortness of breath.    Cardiovascular:  Negative for chest pain, palpitations and leg swelling.   Gastrointestinal:  Negative for abdominal pain, constipation, diarrhea and nausea.   Endocrine: Negative for cold intolerance and heat intolerance.   Genitourinary:  Negative for dysuria, frequency, hematuria, pelvic pain and vaginal bleeding.   Musculoskeletal:  Negative for arthralgias, back pain, gait problem, joint swelling and myalgias.   Skin:  Negative for color change and rash.   Allergic/Immunologic: Negative for environmental allergies and food allergies.   Neurological:  Negative for dizziness, tremors, speech difficulty, weakness, numbness and headaches.   Hematological:  Does not bruise/bleed easily.   Psychiatric/Behavioral:  Negative for agitation, dysphoric mood and sleep disturbance. The patient is not nervous/anxious.         Past Medical History  She has no past medical history on file.    Surgical History  She has no past surgical history on file.    Family History  Cancer-related family history includes Breast cancer (age of onset: 40) in her maternal grandfather.     Social History  She reports that she has never smoked. She has never used smokeless tobacco. No history on file for alcohol use and drug use.    Allergies  Patient has no known allergies.    Medications  Current Outpatient Medications   Medication Instructions    acetaminophen (Tylenol) 500 mg tablet Every 6 hours PRN    doxycycline (VIBRA-TABS) 100 mg, oral, 2 times daily, Take with a full glass of water and do not lie down for at least 30 minutes after.    famotidine (Pepcid) 10 mg tablet Take by mouth.    ibuprofen 200 mg, Every 6 hours    predniSONE (Deltasone) 10 mg tablet Weeks 1-2: 30 mg (3 tabs) PO BID x14 days. Week 3: 20mg (2 tabs) PO BID x7 days. Week 4: 10mg (1 tab) PO BID x7 days. Week 5: 5 mg (1/2 tab) PO  BID x7 days. Week 6: 5 mg (1/2 tab) PO daily x7 days, D/C    predniSONE (Deltasone) 10 mg tablet Weeks 1-2: 30 mg (3 tabs) PO BID x14 days. Week 3: 20mg (2 tabs) PO BID x7 days. Week 4: 10mg (1 tab) PO BID x7 days. Week 5: 5 mg (1/2 tab) PO BID x7 days. Week 6: 5 mg (1/2 tab) PO daily x7 days, D/C       Last Recorded Vitals  Vitals:    03/17/25 0800   BP: 123/83   Pulse: 74   Resp: 16            Physical Exam  Chest:         Patient is alert and oriented x3 and in a relaxed and appropriate mood. Her gait is steady and hand grasps are equal. Sclera is clear. The breasts are nearly symmetrical. Left breast 9:00 is a 2 cm x 1 cm flat area of erythema, warmth, and tenderness with palaption. The nipple appears normal. There is no cervical, supraclavicular or axillary lymphadenopathy.     Relevant Results and Imaging  BI mammo bilateral diagnostic tomosynthesis 12/24/2024  BI US breast limited left 12/24/2024    Narrative  Interpreted By:  Lotus Early,  STUDY:  BI US BREAST LIMITED LEFT; BI MAMMO BILATERAL DIAGNOSTIC  TOMOSYNTHESIS;  12/24/2024 3:15 pm; 12/24/2024 3:35 pm    ACCESSION NUMBER(S):  [id number]; [id number]    ORDERING CLINICIAN:  MCKAY DENNISON    INDICATION:  History of 2 benign left breast biopsies. Patient had an outside  consultation and additional images were recommended for bilateral  asymmetries. Ultrasound was also recommended for a left breast  palpable area of concern.    ,R92.8 Other abnormal and inconclusive findings on diagnostic imaging  of breast    COMPARISON:  11/26/2024, 11/15/2024    FINDINGS:  MAMMOGRAPHY: 2D and tomosynthesis images were reviewed at 1 mm slice  thickness.    Density:  The breasts are heterogeneously dense, which may obscure  small masses.    The previously noted asymmetry centrally at far posterior depth on  the right MLO view resolves into fibroglandular and fatty breast  tissue with the additional images. The previously noted masslike  asymmetry in the lateral  left breast resolves into fibroglandular and  fatty breast tissue. An ultrasound on the left will be performed for  further evaluation. No suspicious masses or calcifications are  identified.    ULTRASOUND: Targeted ultrasound was performed of the lateral left  breast. There are no suspicious masses or suspicious areas of  acoustic shadowing. The previous area of palpable concern was  evaluated. The palpable area of corresponds to the biopsy sites with  2 biopsy marking clips.    Impression  No mammographic or targeted sonographic evidence of malignancy.  Palpable area of concern corresponds to the areas previously  biopsied. Clinical follow-up is recommended.    BI-RADS CATEGORY:  BI-RADS Category:  2 Benign.  Recommendation:  Clinical Follow-up and Continued Annual Screening.  Recommended Date:  Immediate.  Laterality:  Left.    For any future breast imaging appointments, please call [phone] ([phone]).      MACRO:  None    Signed by: Lotus Early 12/24/2024 3:38 PM  Dictation workstation:   [id number]      Visit Diagnosis  1. Granulomatous mastitis of left breast  doxycycline (Vibra-Tabs) 100 mg tablet          Assessment/Plan  Left breast granulomatous mastitis on core biopsy, left breast pain and infection, family history breast cancer, heterogeneously dense    Plan/Patient Discussion/Summary  Doxycycline twice a day for 14 days. Return in 2 weeks or sooner if symptoms worsen.    You can see your health information, review clinical summaries from office visits & test results online when you follow your health with MY  Chart, a personal health record. To sign up go to [email or web address]. If you need assistance with signing up or trouble getting into your account call [employer] Patient Line 24/7 at [phone].    My office phone number is [phone] if you need to get in touch with me or have additional questions or concerns. Thank you for choosing [facility] and trusting me as your  healthcare provider. I look forward to seeing you again at your next office visit. I am honored to be a provider on your health care team and I remain dedicated to helping you achieve your health goals.    OBED Obrien-CNP

## 2025-04-04 ENCOUNTER — OFFICE VISIT (OUTPATIENT)
Dept: SURGICAL ONCOLOGY | Facility: HOSPITAL | Age: 40
End: 2025-04-04
Payer: COMMERCIAL

## 2025-04-04 VITALS
SYSTOLIC BLOOD PRESSURE: 122 MMHG | BODY MASS INDEX: 29.99 KG/M2 | WEIGHT: 180 LBS | HEIGHT: 65 IN | RESPIRATION RATE: 16 BRPM | DIASTOLIC BLOOD PRESSURE: 78 MMHG | HEART RATE: 78 BPM

## 2025-04-04 DIAGNOSIS — N61.22 GRANULOMATOUS MASTITIS OF LEFT BREAST: Primary | ICD-10-CM

## 2025-04-04 DIAGNOSIS — R21 RASH: ICD-10-CM

## 2025-04-04 PROCEDURE — 1036F TOBACCO NON-USER: CPT | Performed by: NURSE PRACTITIONER

## 2025-04-04 PROCEDURE — 3008F BODY MASS INDEX DOCD: CPT | Performed by: NURSE PRACTITIONER

## 2025-04-04 PROCEDURE — 99213 OFFICE O/P EST LOW 20 MIN: CPT | Performed by: NURSE PRACTITIONER

## 2025-04-04 RX ORDER — BETAMETHASONE DIPROPIONATE 0.5 MG/G
1 OINTMENT TOPICAL 2 TIMES DAILY
Qty: 15 G | Refills: 0 | Status: SHIPPED | OUTPATIENT
Start: 2025-04-04 | End: 2025-04-18

## 2025-04-04 NOTE — PROGRESS NOTES
[facility] [place]  Shira Negro female   1985 40 y.o.   [id number]      Chief Complaint  Follow up left breast granulomatous mastitis.    History Of Present Illness  Shira Negro is a very pleasant 40 y.o.  female who originally presented to the breast center with a left breast mass. Her breasts are normally tender around her cycle and goes away. The week of 10/15/24 the left breast pain did not go away and noticed that her nipple was retracted and felt a left breast mass. She had imaging at [facility] and a biopsy was recommended. 11/19/24 she had a left breast ultrasound guided core biopsy. The pain after was worse and more swollen and full. Pathology showed granulomatous changes with inflammation. She completed 10 days of Bactrim and Doxycycline and felt relief. She saw infection disease for further evaluation. She denies breast surgery. She has a family history of breast cancer in her maternal grandfather, age 40.     12/24/2024 Bilateral diagnostic mammogram with left ultrasound, indicates BI-RADS Category 2. No mammographic or targeted sonographic evidence of malignancy. Palpable area of concern corresponds to the areas previously biopsied. Clinical follow-up is recommended.    12/27/2025  Today she does have some tenderness but denies redness, warmth, fever, chills or drainage. Prednisone taper over 12 weeks as directed. Follow up in 3 weeks or sooner if symptoms worsen.     1/16/2025 Today she does have some tenderness but denies redness, warmth, fever, chills or drainage. She feels feels her breast felt better and her nipple was more normal at the highest dose of steroids. As she has started to taper down she has noticed that her symptoms are coming back. With the prednisone she has noticed an increase in appetite, irritability, and bloating. She is agreeable to increase the prednisone and taper as directed. Follow up in 3 weeks or sooner if symptoms worsen.     2/6/2025 As she has  started to taper down on the Prednisone she has noticed that her symptoms are coming back. With the prednisone she has noticed an increase in appetite, irritability, and bloating. On Monday she noticed a red spot on her breast and by Tuesday she was having pain, redness, and warmth. Wednesday she was started back on 10 days of Doxycycline and Bactrim.     2025 She feels feels her breast felt better and her nipple was more normal at the highest dose of steroids. As she has started to taper down she has noticed that her symptoms were coming back and was placed on 10 more days of Doxycycline and Bactrim. She finished these on  and recently had the area open up and start draining serous fluid. It is tender at times. She was placed on Bactrim 1 tab twice a day for 5 days, Doxycycline 1 tab twice a day for 5 days and continue prednisone taper. Return in 2 weeks for follow up.    3/17/25 She finished the Doxycycline and Prednisone taper in the beginning of March. The left breast become slightly red and tender without fever or chills. She was placed back on 14 days of Doxycycline.    2025 The area of the biopsy opened up and drained and is now healing. The area by the nipple is firm and itchy. No s/sx of infection.      REPRODUCTIVE HISTORY: menarche age 14, , first birth age 32, did not breastfeed, OCP's x 6 years, premenopausal, LMP 3/18/25, heterogeneously dense                                  FAMILY CANCER HISTORY:   Maternal Grandfather: Breast cancer, age 40     Review of Systems  Constitutional:  Negative for appetite change, fatigue, fever and unexpected weight change.   HENT:  Negative for ear pain, hearing loss, nosebleeds, sore throat and trouble swallowing.    Eyes:  Negative for discharge, itching and visual disturbance.   Breast: As stated in HPI.  Respiratory:  Negative for cough, chest tightness and shortness of breath.    Cardiovascular:  Negative for chest pain, palpitations and leg  swelling.   Gastrointestinal:  Negative for abdominal pain, constipation, diarrhea and nausea.   Endocrine: Negative for cold intolerance and heat intolerance.   Genitourinary:  Negative for dysuria, frequency, hematuria, pelvic pain and vaginal bleeding.   Musculoskeletal:  Negative for arthralgias, back pain, gait problem, joint swelling and myalgias.   Skin:  Negative for color change and rash.   Allergic/Immunologic: Negative for environmental allergies and food allergies.   Neurological:  Negative for dizziness, tremors, speech difficulty, weakness, numbness and headaches.   Hematological:  Does not bruise/bleed easily.   Psychiatric/Behavioral:  Negative for agitation, dysphoric mood and sleep disturbance. The patient is not nervous/anxious.         Past Medical History  She has no past medical history on file.    Surgical History  She has no past surgical history on file.    Family History  Cancer-related family history includes Breast cancer (age of onset: 40) in her maternal grandfather.     Social History  She reports that she has never smoked. She has never used smokeless tobacco. No history on file for alcohol use and drug use.    Allergies  Patient has no known allergies.    Medications  Current Outpatient Medications   Medication Instructions    acetaminophen (Tylenol) 500 mg tablet Every 6 hours PRN    betamethasone dipropionate (Diprosone) 0.05 % ointment 1 Application, Topical, 2 times daily    famotidine (Pepcid) 10 mg tablet Take by mouth.    ibuprofen 200 mg, Every 6 hours       Last Recorded Vitals  Vitals:    04/04/25 0759   BP: 122/78   Pulse: 78   Resp: 16            Physical Exam  Chest:         Patient is alert and oriented x3 and in a relaxed and appropriate mood. Her gait is steady and hand grasps are equal. Sclera is clear. The breasts are nearly symmetrical. Left breast 9:00 is a 2 cm x 1 cm flat area that is healing without purulent drainage. Left breast 9:00 red and raised rash. The  nipple appears normal. There is no cervical, supraclavicular or axillary lymphadenopathy.         Relevant Results and Imaging  BI mammo bilateral diagnostic tomosynthesis 12/24/2024  BI US breast limited left 12/24/2024    Narrative  Interpreted By:  Lotus Early,  STUDY:  BI US BREAST LIMITED LEFT; BI MAMMO BILATERAL DIAGNOSTIC  TOMOSYNTHESIS;  12/24/2024 3:15 pm; 12/24/2024 3:35 pm    ACCESSION NUMBER(S):  [id number]; [id number]    ORDERING CLINICIAN:  MCKAY DENNISON    INDICATION:  History of 2 benign left breast biopsies. Patient had an outside  consultation and additional images were recommended for bilateral  asymmetries. Ultrasound was also recommended for a left breast  palpable area of concern.    ,R92.8 Other abnormal and inconclusive findings on diagnostic imaging  of breast    COMPARISON:  11/26/2024, 11/15/2024    FINDINGS:  MAMMOGRAPHY: 2D and tomosynthesis images were reviewed at 1 mm slice  thickness.    Density:  The breasts are heterogeneously dense, which may obscure  small masses.    The previously noted asymmetry centrally at far posterior depth on  the right MLO view resolves into fibroglandular and fatty breast  tissue with the additional images. The previously noted masslike  asymmetry in the lateral left breast resolves into fibroglandular and  fatty breast tissue. An ultrasound on the left will be performed for  further evaluation. No suspicious masses or calcifications are  identified.    ULTRASOUND: Targeted ultrasound was performed of the lateral left  breast. There are no suspicious masses or suspicious areas of  acoustic shadowing. The previous area of palpable concern was  evaluated. The palpable area of corresponds to the biopsy sites with  2 biopsy marking clips.    Impression  No mammographic or targeted sonographic evidence of malignancy.  Palpable area of concern corresponds to the areas previously  biopsied. Clinical follow-up is recommended.    BI-RADS CATEGORY:  BI-RADS  Category:  2 Benign.  Recommendation:  Clinical Follow-up and Continued Annual Screening.  Recommended Date:  Immediate.  Laterality:  Left.    For any future breast imaging appointments, please call [phone] ([phone]).      MACRO:  None    Signed by: Lotus Early 12/24/2024 3:38 PM  Dictation workstation:   [id number]      Visit Diagnosis  1. Granulomatous mastitis of left breast        2. Rash  betamethasone dipropionate (Diprosone) 0.05 % ointment          Assessment/Plan  Left breast granulomatous mastitis on core biopsy, left breast rash and itching, healing left breast small abscess, family history breast cancer, heterogeneously dense    Plan/Patient Discussion/Summary  Betamethasone twice a day for 14 days to red and itchy rash. Return in one month for follow up or sooner should symptoms worsen.    You can see your health information, review clinical summaries from office visits & test results online when you follow your health with MY  Chart, a personal health record. To sign up go to [email or web address]. If you need assistance with signing up or trouble getting into your account call [employer] Patient Line 24/7 at [phone].    My office phone number is [phone] if you need to get in touch with me or have additional questions or concerns. Thank you for choosing [facility] and trusting me as your healthcare provider. I look forward to seeing you again at your next office visit. I am honored to be a provider on your health care team and I remain dedicated to helping you achieve your health goals.    Zoie Rizo, OBED-CNP

## 2025-05-09 ENCOUNTER — OFFICE VISIT (OUTPATIENT)
Dept: SURGICAL ONCOLOGY | Facility: HOSPITAL | Age: 40
End: 2025-05-09
Payer: COMMERCIAL

## 2025-05-09 VITALS
RESPIRATION RATE: 16 BRPM | SYSTOLIC BLOOD PRESSURE: 159 MMHG | HEART RATE: 82 BPM | DIASTOLIC BLOOD PRESSURE: 75 MMHG | BODY MASS INDEX: 29.99 KG/M2 | WEIGHT: 180 LBS | HEIGHT: 65 IN

## 2025-05-09 DIAGNOSIS — N61.22 GRANULOMATOUS MASTITIS OF LEFT BREAST: Primary | ICD-10-CM

## 2025-05-09 PROCEDURE — 1036F TOBACCO NON-USER: CPT | Performed by: NURSE PRACTITIONER

## 2025-05-09 PROCEDURE — 99213 OFFICE O/P EST LOW 20 MIN: CPT | Performed by: NURSE PRACTITIONER

## 2025-05-09 PROCEDURE — 3008F BODY MASS INDEX DOCD: CPT | Performed by: NURSE PRACTITIONER

## 2025-05-09 NOTE — PROGRESS NOTES
[facility] [place], [place]  Shira Negro female   1985 40 y.o.   [id number]      Chief Complaint  Follow up left breast granulomatous mastitis.    History Of Present Illness  Shira Negro is a very pleasant 40 y.o.  female who originally presented to the breast center with a left breast mass. Her breasts are normally tender around her cycle and goes away. The week of 10/15/24 the left breast pain did not go away and noticed that her nipple was retracted and felt a left breast mass. She had imaging at [facility] and a biopsy was recommended. 11/19/24 she had a left breast ultrasound guided core biopsy. The pain after was worse and more swollen and full. Pathology showed granulomatous changes with inflammation. She completed 10 days of Bactrim and Doxycycline and felt relief. She saw infection disease for further evaluation. She denies breast surgery. She has a family history of breast cancer in her maternal grandfather, age 40.     12/24/2024 Bilateral diagnostic mammogram with left ultrasound, indicates BI-RADS Category 2. No mammographic or targeted sonographic evidence of malignancy. Palpable area of concern corresponds to the areas previously biopsied. Clinical follow-up is recommended.    12/27/2025  Today she does have some tenderness but denies redness, warmth, fever, chills or drainage. Prednisone taper over 12 weeks as directed. Follow up in 3 weeks or sooner if symptoms worsen.     1/16/2025 Today she does have some tenderness but denies redness, warmth, fever, chills or drainage. She feels feels her breast felt better and her nipple was more normal at the highest dose of steroids. As she has started to taper down she has noticed that her symptoms are coming back. With the prednisone she has noticed an increase in appetite, irritability, and bloating. She is agreeable to increase the prednisone and taper as directed. Follow up in 3 weeks or sooner if symptoms worsen.     2/6/2025 As she has  started to taper down on the Prednisone she has noticed that her symptoms are coming back. With the prednisone she has noticed an increase in appetite, irritability, and bloating. On Monday she noticed a red spot on her breast and by Tuesday she was having pain, redness, and warmth. Wednesday she was started back on 10 days of Doxycycline and Bactrim.     2025 She feels feels her breast felt better and her nipple was more normal at the highest dose of steroids. As she has started to taper down she has noticed that her symptoms were coming back and was placed on 10 more days of Doxycycline and Bactrim. She finished these on  and recently had the area open up and start draining serous fluid. It is tender at times. She was placed on Bactrim 1 tab twice a day for 5 days, Doxycycline 1 tab twice a day for 5 days and continue prednisone taper. Return in 2 weeks for follow up.    3/17/25 She finished the Doxycycline and Prednisone taper in the beginning of March. The left breast become slightly red and tender without fever or chills. She was placed back on 14 days of Doxycycline.    2025 The area of the biopsy opened up and drained and is now healing. The area by the nipple is firm and itchy. No s/sx of infection. She only used Betamethasone a couple of times.    2025 The area is still slightly discolored but is closed. It is tender at times.      REPRODUCTIVE HISTORY: menarche age 14, , first birth age 32, did not breastfeed, OCP's x 6 years, premenopausal, LMP 3/18/25, heterogeneously dense                                  FAMILY CANCER HISTORY:   Maternal Grandfather: Breast cancer, age 40     Review of Systems  Constitutional:  Negative for appetite change, fatigue, fever and unexpected weight change.   HENT:  Negative for ear pain, hearing loss, nosebleeds, sore throat and trouble swallowing.    Eyes:  Negative for discharge, itching and visual disturbance.   Breast: As stated in  HPI.  Respiratory:  Negative for cough, chest tightness and shortness of breath.    Cardiovascular:  Negative for chest pain, palpitations and leg swelling.   Gastrointestinal:  Negative for abdominal pain, constipation, diarrhea and nausea.   Endocrine: Negative for cold intolerance and heat intolerance.   Genitourinary:  Negative for dysuria, frequency, hematuria, pelvic pain and vaginal bleeding.   Musculoskeletal:  Negative for arthralgias, back pain, gait problem, joint swelling and myalgias.   Skin:  Negative for color change and rash.   Allergic/Immunologic: Negative for environmental allergies and food allergies.   Neurological:  Negative for dizziness, tremors, speech difficulty, weakness, numbness and headaches.   Hematological:  Does not bruise/bleed easily.   Psychiatric/Behavioral:  Negative for agitation, dysphoric mood and sleep disturbance. The patient is not nervous/anxious.         Past Medical History  She has no past medical history on file.    Surgical History  She has no past surgical history on file.    Family History  Cancer-related family history includes Breast cancer (age of onset: 40) in her maternal grandfather.     Social History  She reports that she has never smoked. She has never used smokeless tobacco. No history on file for alcohol use and drug use.    Allergies  Patient has no known allergies.    Medications  Current Outpatient Medications   Medication Instructions    acetaminophen (Tylenol) 500 mg tablet Every 6 hours PRN    famotidine (Pepcid) 10 mg tablet Take by mouth.    ibuprofen 200 mg, Every 6 hours       Last Recorded Vitals  Vitals:    05/09/25 1057   BP: 159/75   Pulse: 82   Resp: 16            Physical Exam  Chest:         Patient is alert and oriented x3 and in a relaxed and appropriate mood. Her gait is steady and hand grasps are equal. Sclera is clear. The breasts are nearly symmetrical. Left breast 9:00 is a 1 cm x 1 cm flat area that is healing without purulent  drainage. The nipple appears normal. There is no cervical, supraclavicular or axillary lymphadenopathy.         Relevant Results and Imaging  BI mammo bilateral diagnostic tomosynthesis 12/24/2024  BI US breast limited left 12/24/2024    Narrative  Interpreted By:  Lotus Early,  STUDY:  BI US BREAST LIMITED LEFT; BI MAMMO BILATERAL DIAGNOSTIC  TOMOSYNTHESIS;  12/24/2024 3:15 pm; 12/24/2024 3:35 pm    ACCESSION NUMBER(S):  [id number]; [id number]    ORDERING CLINICIAN:  MCKAY DENNISON    INDICATION:  History of 2 benign left breast biopsies. Patient had an outside  consultation and additional images were recommended for bilateral  asymmetries. Ultrasound was also recommended for a left breast  palpable area of concern.    ,R92.8 Other abnormal and inconclusive findings on diagnostic imaging  of breast    COMPARISON:  11/26/2024, 11/15/2024    FINDINGS:  MAMMOGRAPHY: 2D and tomosynthesis images were reviewed at 1 mm slice  thickness.    Density:  The breasts are heterogeneously dense, which may obscure  small masses.    The previously noted asymmetry centrally at far posterior depth on  the right MLO view resolves into fibroglandular and fatty breast  tissue with the additional images. The previously noted masslike  asymmetry in the lateral left breast resolves into fibroglandular and  fatty breast tissue. An ultrasound on the left will be performed for  further evaluation. No suspicious masses or calcifications are  identified.    ULTRASOUND: Targeted ultrasound was performed of the lateral left  breast. There are no suspicious masses or suspicious areas of  acoustic shadowing. The previous area of palpable concern was  evaluated. The palpable area of corresponds to the biopsy sites with  2 biopsy marking clips.    Impression  No mammographic or targeted sonographic evidence of malignancy.  Palpable area of concern corresponds to the areas previously  biopsied. Clinical follow-up is recommended.    BI-RADS  CATEGORY:  BI-RADS Category:  2 Benign.  Recommendation:  Clinical Follow-up and Continued Annual Screening.  Recommended Date:  Immediate.  Laterality:  Left.    For any future breast imaging appointments, please call [phone] ([phone]).      MACRO:  None    Signed by: Lotus Early 12/24/2024 3:38 PM  Dictation workstation:   [id number]      Visit Diagnosis  1. Granulomatous mastitis of left breast            Assessment/Plan  Left breast granulomatous mastitis on core biopsy, family history breast cancer, heterogeneously dense    Plan/Patient Discussion/Summary  Return December 2025 for bilateral diagnostic mammogram with left breast ultrasound and office visit or sooner should symptoms worsen.    You can see your health information, review clinical summaries from office visits & test results online when you follow your health with MY  Chart, a personal health record. To sign up go to [email or web address]. If you need assistance with signing up or trouble getting into your account call [employer] Patient Line 24/7 at [phone].    My office phone number is [phone] if you need to get in touch with me or have additional questions or concerns. Thank you for choosing [facility] and trusting me as your healthcare provider. I look forward to seeing you again at your next office visit. I am honored to be a provider on your health care team and I remain dedicated to helping you achieve your health goals.    Zoie Rizo, OBED-CNP